# Patient Record
Sex: MALE | Race: WHITE | NOT HISPANIC OR LATINO | Employment: OTHER | URBAN - METROPOLITAN AREA
[De-identification: names, ages, dates, MRNs, and addresses within clinical notes are randomized per-mention and may not be internally consistent; named-entity substitution may affect disease eponyms.]

---

## 2017-04-24 ENCOUNTER — PREPPED CHART (OUTPATIENT)
Dept: URBAN - METROPOLITAN AREA CLINIC 43 | Facility: CLINIC | Age: 77
End: 2017-04-24

## 2018-02-16 NOTE — PATIENT DISCUSSION
Patient educated with the Basic option, they will most likely need prescription glasses at all focal points after surgery. Patient elects Basic OD, goal of emmetropia.

## 2018-04-23 ENCOUNTER — ESTABLISHED COMPREHENSIVE EXAM (OUTPATIENT)
Dept: URBAN - METROPOLITAN AREA CLINIC 43 | Facility: CLINIC | Age: 78
End: 2018-04-23

## 2018-04-23 DIAGNOSIS — H34.233: ICD-10-CM

## 2018-04-23 DIAGNOSIS — G43.B1: ICD-10-CM

## 2018-04-23 DIAGNOSIS — H35.3131: ICD-10-CM

## 2018-04-23 DIAGNOSIS — Z96.1: ICD-10-CM

## 2018-04-23 DIAGNOSIS — H26.493: ICD-10-CM

## 2018-04-23 PROCEDURE — 1036F TOBACCO NON-USER: CPT

## 2018-04-23 PROCEDURE — 92014 COMPRE OPH EXAM EST PT 1/>: CPT

## 2018-04-23 PROCEDURE — 92015 DETERMINE REFRACTIVE STATE: CPT

## 2018-04-23 PROCEDURE — G8428 CUR MEDS NOT DOCUMENT: HCPCS

## 2018-04-23 PROCEDURE — G8785 BP SCRN NO PERF AT INTERVAL: HCPCS

## 2018-04-23 ASSESSMENT — VISUAL ACUITY
OS_SC: J2
OD_SC: 20/40+2
OD_SC: J2
OS_SC: 20/40+2

## 2018-04-23 ASSESSMENT — TONOMETRY
OS_IOP_MMHG: 13
OD_IOP_MMHG: 12

## 2018-07-10 NOTE — PROCEDURE NOTE: SURGICAL
<p>Prior to commencing surgery patient identification, surgical procedure, site, and side were confirmed by Dr. Nader Meléndez. Following topical proparacaine anesthesia, the patient was positioned at the YAG laser, a contact lens coupled to the cornea with methylcellulose and an axial posterior capsulotomy performed without complication using 3.6 Mj x 11. Excess methylcellulose was washed from the eye, one drop of Alphagan was instilled and the patient returned to the holding area having tolerated the procedure well and without complication. </p><p>MRN [id number]</p>

## 2018-07-17 NOTE — PROCEDURE NOTE: SURGICAL
<p>Prior to commencing surgery patient identification, surgical procedure, site, and side were confirmed by Dr. Sondra Macario. Following topical proparacaine anesthesia, the patient was positioned at the YAG laser, a contact lens coupled to the cornea with methylcellulose and an axial posterior capsulotomy performed without complication using 3.5 Mj x 19. Excess methylcellulose was washed from the eye, one drop of Alphagan was instilled and the patient returned to the holding area having tolerated the procedure well and without complication. </p><p>MRN [id number]</p>

## 2019-04-29 ENCOUNTER — ESTABLISHED COMPREHENSIVE EXAM (OUTPATIENT)
Dept: URBAN - METROPOLITAN AREA CLINIC 43 | Facility: CLINIC | Age: 79
End: 2019-04-29

## 2019-04-29 DIAGNOSIS — H26.493: ICD-10-CM

## 2019-04-29 DIAGNOSIS — Z96.1: ICD-10-CM

## 2019-04-29 DIAGNOSIS — H34.233: ICD-10-CM

## 2019-04-29 DIAGNOSIS — G43.B1: ICD-10-CM

## 2019-04-29 DIAGNOSIS — H35.3131: ICD-10-CM

## 2019-04-29 PROCEDURE — 92015 DETERMINE REFRACTIVE STATE: CPT

## 2019-04-29 PROCEDURE — 92014 COMPRE OPH EXAM EST PT 1/>: CPT

## 2019-04-29 ASSESSMENT — VISUAL ACUITY
OD_SC: J1
OD_SC: 20/40+2
OS_SC: 20/30-2
OS_SC: J1

## 2019-04-29 ASSESSMENT — TONOMETRY
OD_IOP_MMHG: 14
OS_IOP_MMHG: 14

## 2021-04-20 ENCOUNTER — ESTABLISHED COMPREHENSIVE EXAM (OUTPATIENT)
Dept: URBAN - METROPOLITAN AREA CLINIC 43 | Facility: CLINIC | Age: 81
End: 2021-04-20

## 2021-04-20 DIAGNOSIS — H35.3131: ICD-10-CM

## 2021-04-20 DIAGNOSIS — H26.493: ICD-10-CM

## 2021-04-20 DIAGNOSIS — H34.233: ICD-10-CM

## 2021-04-20 DIAGNOSIS — G43.B1: ICD-10-CM

## 2021-04-20 DIAGNOSIS — Z96.1: ICD-10-CM

## 2021-04-20 PROCEDURE — 92014 COMPRE OPH EXAM EST PT 1/>: CPT

## 2021-04-20 PROCEDURE — 92015 DETERMINE REFRACTIVE STATE: CPT

## 2021-04-20 ASSESSMENT — TONOMETRY
OS_IOP_MMHG: 13
OD_IOP_MMHG: 12

## 2021-04-20 ASSESSMENT — VISUAL ACUITY
OD_SC: J1
OS_SC: 20/30-2
OD_SC: 20/40+1
OS_SC: J1

## 2022-04-21 ENCOUNTER — COMPREHENSIVE EXAM (OUTPATIENT)
Dept: URBAN - METROPOLITAN AREA CLINIC 43 | Facility: CLINIC | Age: 82
End: 2022-04-21

## 2022-04-21 DIAGNOSIS — Z96.1: ICD-10-CM

## 2022-04-21 DIAGNOSIS — H26.493: ICD-10-CM

## 2022-04-21 DIAGNOSIS — H35.3131: ICD-10-CM

## 2022-04-21 DIAGNOSIS — H34.233: ICD-10-CM

## 2022-04-21 DIAGNOSIS — G43.B1: ICD-10-CM

## 2022-04-21 PROCEDURE — 92015 DETERMINE REFRACTIVE STATE: CPT

## 2022-04-21 PROCEDURE — 92014 COMPRE OPH EXAM EST PT 1/>: CPT

## 2022-04-21 ASSESSMENT — VISUAL ACUITY
OS_SC: 20/40+1
OS_SC: J1
OD_SC: 20/30-2
OD_SC: J1

## 2022-04-21 ASSESSMENT — TONOMETRY
OS_IOP_MMHG: 12
OD_IOP_MMHG: 12

## 2022-05-25 NOTE — PATIENT DISCUSSION
[place]ler grid at home. MVI/AREDS discussed. Patient to call if any changes in vision or grid card.
Artificial Tears: One drop to both eyes 3-4 times daily. We recommend Systane or Refresh lubricating eye drops which can be found at any pharmacy.
Continue with post-operative drops until completed.
Diamox x 1 OU, Amvisc, OU, +/- Trypan BLue OS.
Discussed AREDS 2 supplements, UV protection and green leafy vegetables.
Good postoperative appearance.
Monitor.
Patient reassured vision should improve as corneal edema resolves.
Patient understands there is an increased risk of corneal edema after cataract surgery.
Use drops as directed.
Will check refraction at next visit.
Statement Selected

## 2023-04-24 ENCOUNTER — COMPREHENSIVE EXAM (OUTPATIENT)
Dept: URBAN - METROPOLITAN AREA CLINIC 43 | Facility: CLINIC | Age: 83
End: 2023-04-24

## 2023-04-24 DIAGNOSIS — H35.3131: ICD-10-CM

## 2023-04-24 DIAGNOSIS — G43.B1: ICD-10-CM

## 2023-04-24 DIAGNOSIS — Z96.1: ICD-10-CM

## 2023-04-24 DIAGNOSIS — H26.493: ICD-10-CM

## 2023-04-24 DIAGNOSIS — H40.013: ICD-10-CM

## 2023-04-24 DIAGNOSIS — H34.233: ICD-10-CM

## 2023-04-24 PROCEDURE — 92015 DETERMINE REFRACTIVE STATE: CPT

## 2023-04-24 PROCEDURE — 92014 COMPRE OPH EXAM EST PT 1/>: CPT

## 2023-04-24 ASSESSMENT — VISUAL ACUITY
OD_SC: J1
OD_SC: 20/25+1
OS_SC: J1-
OS_SC: 20/25-2

## 2023-04-24 ASSESSMENT — TONOMETRY
OD_IOP_MMHG: 11
OS_IOP_MMHG: 12

## 2024-04-25 ENCOUNTER — COMPREHENSIVE EXAM (OUTPATIENT)
Dept: URBAN - METROPOLITAN AREA CLINIC 43 | Facility: CLINIC | Age: 84
End: 2024-04-25

## 2024-04-25 DIAGNOSIS — Z96.1: ICD-10-CM

## 2024-04-25 DIAGNOSIS — H35.3131: ICD-10-CM

## 2024-04-25 DIAGNOSIS — G43.B1: ICD-10-CM

## 2024-04-25 DIAGNOSIS — H26.493: ICD-10-CM

## 2024-04-25 DIAGNOSIS — H34.233: ICD-10-CM

## 2024-04-25 DIAGNOSIS — H40.013: ICD-10-CM

## 2024-04-25 PROCEDURE — 92015 DETERMINE REFRACTIVE STATE: CPT

## 2024-04-25 PROCEDURE — 92014 COMPRE OPH EXAM EST PT 1/>: CPT

## 2024-04-25 ASSESSMENT — TONOMETRY
OD_IOP_MMHG: 13
OS_IOP_MMHG: 16

## 2024-04-25 ASSESSMENT — VISUAL ACUITY
OD_SC: J1-
OS_SC: J1-
OS_SC: 20/30-1
OD_SC: 20/25-1

## 2024-06-09 NOTE — PATIENT DISCUSSION
Amsler grid at home. MVI/AREDS discussed. Patient to call if any changes in vision or grid card.
Artificial Tears: One drop to both eyes 3-4 times daily. We recommend Systane or Refresh lubricating eye drops which can be found at any pharmacy.
Diamox x 1 OU, Amvisc, OU, +/- Trypan BLue OS.
Discussed AREDS 2 supplements, UV protection and green leafy vegetables.
Good postoperative appearance.
Instructed to call immediately if any new distortion, blurring, decreased vision or eye pain.
Monitor.
Patient educated with the Basic option, they will most likely need prescription glasses at all focal points after surgery. Patient elects Basic OD, goal of emmetropia.
Patient elects Basic with goal of Eileen OS.
Patient happy with [facility].
Patient understands condition, prognosis and need for follow up care.
Patient understands there is an increased risk of corneal edema after cataract surgery.
Post op gtt instructions reviewed with patient.
The patient feels that the cataract is significantly impacting daily activities and has elected cataract surgery. The risks, benefits, and alternatives to surgery were discussed. The patient elects to proceed with surgery.
The types of intraocular lenses were reviewed with the patient along with a discussion of their various strengths and weaknesses.
Use drops as directed.
Spontaneous, unlabored and symmetrical

## 2025-04-25 ENCOUNTER — COMPREHENSIVE EXAM (OUTPATIENT)
Age: 85
End: 2025-04-25

## 2025-04-25 DIAGNOSIS — H26.493: ICD-10-CM

## 2025-04-25 DIAGNOSIS — H34.233: ICD-10-CM

## 2025-04-25 DIAGNOSIS — H35.3131: ICD-10-CM

## 2025-04-25 DIAGNOSIS — G43.B1: ICD-10-CM

## 2025-04-25 DIAGNOSIS — H40.013: ICD-10-CM

## 2025-04-25 PROCEDURE — 92015 DETERMINE REFRACTIVE STATE: CPT

## 2025-04-25 PROCEDURE — 92014 COMPRE OPH EXAM EST PT 1/>: CPT
